# Patient Record
Sex: MALE | Race: WHITE | Employment: OTHER | URBAN - METROPOLITAN AREA
[De-identification: names, ages, dates, MRNs, and addresses within clinical notes are randomized per-mention and may not be internally consistent; named-entity substitution may affect disease eponyms.]

---

## 2017-06-06 ENCOUNTER — TELEPHONE (OUTPATIENT)
Dept: NEUROLOGY | Facility: CLINIC | Age: 82
End: 2017-06-06

## 2017-06-06 NOTE — TELEPHONE ENCOUNTER
"Patient was last seen by Dr. Guerrero 12/03/13.  Received phone call from daughter Martha who reports her dad continues to live independently, however she contacts him daily to provide reminders to take medications, appointments, etc.  Martha reports that she takes her dad to her home every few months for a couple of days. Her sister feels that it's not appropriate to take her father to her home as this \"disorients him\".  Martha calls requesting a letter stating that her father is not negatively impacted by staying at her home for a few days every few months.    PLAN:  Advised Martha to schedule an appointment with [facility] for further evaluation and to discuss her concerns with MD.  Martha will call back if she decides to schedule an appointment.  Will likely look for additional care locally (near [place]).  No further action needed.      "

## 2017-06-06 NOTE — TELEPHONE ENCOUNTER
Demetrius Warren, RN  Ilene Mcdonnell RN                     Previous Messages       ----- Message -----      From: Abad Zuñiga CMA      Sent: 6/6/2017  10:07 AM        To: Diomedes Hutchins   Subject: [facility]                            Caller: toshia   Relationship to Patient: daughter   Call Back Number: [phone]  Reason for Call: Patient hasn't been seen in a while. Daughter has a generic question that would like an answer for.

## 2017-10-14 ENCOUNTER — NURSE TRIAGE (OUTPATIENT)
Dept: NURSING | Facility: CLINIC | Age: 82
End: 2017-10-14

## 2017-10-14 NOTE — TELEPHONE ENCOUNTER
Caller needed prior CPAP settings from chart in epic, ordered new CPAP but did not have pressure setting and awaiting new sleep appt next week. Provided documentation of readings and CPAP settings from note from sleep center back in 2015. No further triage needed.    Evelyn Gifford RN, BSN  [facility]